# Patient Record
Sex: MALE | Race: WHITE | NOT HISPANIC OR LATINO | URBAN - METROPOLITAN AREA
[De-identification: names, ages, dates, MRNs, and addresses within clinical notes are randomized per-mention and may not be internally consistent; named-entity substitution may affect disease eponyms.]

---

## 2018-03-30 ENCOUNTER — OFFICE (OUTPATIENT)
Dept: URBAN - METROPOLITAN AREA CLINIC 33 | Facility: CLINIC | Age: 38
End: 2018-03-30

## 2018-03-30 VITALS
DIASTOLIC BLOOD PRESSURE: 91 MMHG | SYSTOLIC BLOOD PRESSURE: 136 MMHG | WEIGHT: 233 LBS | HEIGHT: 74 IN | HEART RATE: 95 BPM | TEMPERATURE: 97.3 F

## 2018-03-30 DIAGNOSIS — K21.9 GASTRO-ESOPHAGEAL REFLUX DISEASE WITHOUT ESOPHAGITIS: ICD-10-CM

## 2018-03-30 DIAGNOSIS — Z83.79 FAMILY HISTORY OF OTHER DISEASES OF THE DIGESTIVE SYSTEM: ICD-10-CM

## 2018-03-30 PROCEDURE — 99244 OFF/OP CNSLTJ NEW/EST MOD 40: CPT

## 2018-03-30 NOTE — SERVICEHPINOTES
MINA ZAMARRIPA   is a   37   year old male who is being seen in consultation at the request of   ALFA MCINTYRE   for acid reflux. He first noticed reflux initially as occurring occasionally every other month but recently occurring more often. Reports 3 weeks ago, he had flare up of reflux lasting 4 days feeling esophagus irritation, globus sensation, and belching. Took Prilosec 20 mg x 10 days--last taken March 21st. He reports relief of reflux on PPI self trial but wants to have EGD to know if he will need to be on PPI for long term. He has been avoiding spicy foods/coffee and not eating late at night. He says he has always had throat clearing for many years and not sure if this is related to reflux---no hx upper respiratory disease. In the evening he also has heartburn, but no night time symptoms. Denies nausea, vomiting, sour taste in mouth, hoarseness, dysphagia, chest pain. No lower GI complaints.    Rare NSAID use.BRNo tobacco.BRETOH use 1-2 drinks consumed 2-3x/week No prior EGDBRFM hx Barrette's esophagus via fatherBRFM hx Celiac Disease via daughter   FONT style="BACKGROUND-COLOR: #ffffcc" visited="true"/FONT

## 2018-04-18 ENCOUNTER — OFFICE (OUTPATIENT)
Dept: URBAN - METROPOLITAN AREA CLINIC 32 | Facility: CLINIC | Age: 38
End: 2018-04-18

## 2018-04-18 VITALS
SYSTOLIC BLOOD PRESSURE: 137 MMHG | OXYGEN SATURATION: 100 % | HEART RATE: 64 BPM | OXYGEN SATURATION: 97 % | HEART RATE: 76 BPM | RESPIRATION RATE: 14 BRPM | DIASTOLIC BLOOD PRESSURE: 71 MMHG | TEMPERATURE: 98.1 F | RESPIRATION RATE: 13 BRPM | WEIGHT: 233 LBS | TEMPERATURE: 97.9 F | HEART RATE: 70 BPM | DIASTOLIC BLOOD PRESSURE: 78 MMHG | OXYGEN SATURATION: 94 % | RESPIRATION RATE: 18 BRPM | SYSTOLIC BLOOD PRESSURE: 134 MMHG | DIASTOLIC BLOOD PRESSURE: 96 MMHG | DIASTOLIC BLOOD PRESSURE: 89 MMHG | SYSTOLIC BLOOD PRESSURE: 141 MMHG | SYSTOLIC BLOOD PRESSURE: 121 MMHG | OXYGEN SATURATION: 99 % | HEIGHT: 74 IN

## 2018-04-18 DIAGNOSIS — K21.9 GASTRO-ESOPHAGEAL REFLUX DISEASE WITHOUT ESOPHAGITIS: ICD-10-CM

## 2018-04-18 DIAGNOSIS — K29.60 OTHER GASTRITIS WITHOUT BLEEDING: ICD-10-CM

## 2018-04-18 PROCEDURE — 43239 EGD BIOPSY SINGLE/MULTIPLE: CPT

## 2018-04-18 RX ADMIN — LIDOCAINE HYDROCHLORIDE 60 MG: 20 INJECTION, SOLUTION INFILTRATION; PERINEURAL at 13:46

## 2018-04-18 RX ADMIN — LIDOCAINE HYDROCHLORIDE 40 MG: 20 INJECTION, SOLUTION INFILTRATION; PERINEURAL at 13:46

## 2018-04-18 NOTE — SERVICEHPINOTES
Pt presents for EGD due to GERD, with isolated symptoms that responded completely to 8-day trial of prilosec. No current complaints or issues.